# Patient Record
Sex: MALE | Race: BLACK OR AFRICAN AMERICAN
[De-identification: names, ages, dates, MRNs, and addresses within clinical notes are randomized per-mention and may not be internally consistent; named-entity substitution may affect disease eponyms.]

---

## 2018-05-16 ENCOUNTER — HOSPITAL ENCOUNTER (EMERGENCY)
Dept: HOSPITAL 19 - COL.ER | Age: 33
Discharge: HOME | End: 2018-05-16
Payer: COMMERCIAL

## 2018-05-16 VITALS — HEART RATE: 72 BPM | DIASTOLIC BLOOD PRESSURE: 56 MMHG | SYSTOLIC BLOOD PRESSURE: 106 MMHG

## 2018-05-16 VITALS — HEIGHT: 69.02 IN | WEIGHT: 210.54 LBS | BODY MASS INDEX: 31.18 KG/M2

## 2018-05-16 VITALS — TEMPERATURE: 97.5 F

## 2018-05-16 DIAGNOSIS — L02.415: ICD-10-CM

## 2018-05-16 DIAGNOSIS — F32.9: Primary | ICD-10-CM

## 2018-05-16 LAB
ALBUMIN SERPL-MCNC: 4.2 GM/DL (ref 3.5–5)
ALP SERPL-CCNC: 82 U/L (ref 50–136)
ALT SERPL-CCNC: 50 U/L (ref 21–72)
ANION GAP SERPL CALC-SCNC: 11 MMOL/L (ref 7–16)
APAP SERPL-MCNC: < 10 UG/ML (ref 10–30)
AST SERPL-CCNC: 32 U/L (ref 15–37)
BASOPHILS # BLD: 0 10*3/UL (ref 0–0.2)
BASOPHILS NFR BLD AUTO: 0.4 % (ref 0–2)
BILIRUB SERPL-MCNC: 0.6 MG/DL (ref 0–1)
BUN SERPL-MCNC: 14 MG/DL (ref 9–20)
CALCIUM SERPL-MCNC: 9.6 MG/DL (ref 8.4–10.2)
CHLORIDE SERPL-SCNC: 103 MMOL/L (ref 98–107)
CO2 SERPL-SCNC: 28 MMOL/L (ref 22–30)
CREAT SERPL-SCNC: 1.36 MG/DL (ref 0.66–1.25)
DRUGS UR SCN NOM: NEGATIVE NG/ML
EOSINOPHIL # BLD: 0.1 10*3/UL (ref 0–0.7)
EOSINOPHIL NFR BLD: 1.6 % (ref 0–4)
ERYTHROCYTE [DISTWIDTH] IN BLOOD BY AUTOMATED COUNT: 15.3 % (ref 11.5–14.5)
ETHANOL SPEC-SCNC: < 10 MG/DL
GLUCOSE SERPL-MCNC: 103 MG/DL (ref 74–106)
GRANULOCYTES # BLD AUTO: 73.1 % (ref 42.2–75.2)
HCT VFR BLD AUTO: 45.7 % (ref 42–52)
HGB BLD-MCNC: 14.5 G/DL (ref 13.5–18)
LYMPHOCYTES # BLD: 1.4 10*3/UL (ref 1.2–3.4)
LYMPHOCYTES NFR BLD: 16.8 % (ref 20–51)
MCH RBC QN AUTO: 23 PG (ref 27–31)
MCHC RBC AUTO-ENTMCNC: 32 G/DL (ref 33–37)
MCV RBC AUTO: 74 FL (ref 80–100)
MONOCYTES # BLD: 0.6 10*3/UL (ref 0.1–0.6)
MONOCYTES NFR BLD AUTO: 7 % (ref 1.7–9.3)
NEUTROPHILS # BLD: 6.1 10*3/UL (ref 1.4–6.5)
PLATELET # BLD AUTO: 231 K/MM3 (ref 130–400)
PMV BLD AUTO: 10.7 FL (ref 7.4–10.4)
POTASSIUM SERPL-SCNC: 4.6 MMOL/L (ref 3.4–5)
PROT SERPL-MCNC: 8.4 GM/DL (ref 6.4–8.2)
RBC # BLD AUTO: 6.19 M/MM3 (ref 4.2–5.6)
SALICYLATES SERPL-MCNC: < 1 MG/DL
SODIUM SERPL-SCNC: 142 MMOL/L (ref 137–145)

## 2021-04-30 NOTE — NUR
Air has been removed from TR band in 2ml increments with no bleeding or
complication. Rt radial puncture site dressed with folded 2x2 gauze and
bandaid. Pt is steady on feet. IV DC'd with catheter intact and bleeding
controlled at site. DC instructions reviewed with pt and girlfriend, both
express understanding. Pt is assisted out to car by wheelchair with
belongings.